# Patient Record
Sex: FEMALE | Race: WHITE | Employment: STUDENT | URBAN - METROPOLITAN AREA
[De-identification: names, ages, dates, MRNs, and addresses within clinical notes are randomized per-mention and may not be internally consistent; named-entity substitution may affect disease eponyms.]

---

## 2024-11-14 ENCOUNTER — OFFICE VISIT (OUTPATIENT)
Dept: FAMILY MEDICINE CLINIC | Facility: CLINIC | Age: 29
End: 2024-11-14
Payer: COMMERCIAL

## 2024-11-14 VITALS
WEIGHT: 145.63 LBS | BODY MASS INDEX: 23.41 KG/M2 | SYSTOLIC BLOOD PRESSURE: 150 MMHG | RESPIRATION RATE: 16 BRPM | HEART RATE: 92 BPM | HEIGHT: 66 IN | TEMPERATURE: 97 F | DIASTOLIC BLOOD PRESSURE: 80 MMHG

## 2024-11-14 DIAGNOSIS — N92.6 MENSES, IRREGULAR: ICD-10-CM

## 2024-11-14 DIAGNOSIS — R03.0 ELEVATED BLOOD PRESSURE READING WITHOUT DIAGNOSIS OF HYPERTENSION: ICD-10-CM

## 2024-11-14 DIAGNOSIS — Z00.00 ANNUAL PHYSICAL EXAM: Primary | ICD-10-CM

## 2024-11-14 DIAGNOSIS — Z12.4 PAPANICOLAOU SMEAR FOR CERVICAL CANCER SCREENING: ICD-10-CM

## 2024-11-14 DIAGNOSIS — Z00.00 LABORATORY EXAMINATION ORDERED AS PART OF A ROUTINE GENERAL MEDICAL EXAMINATION: ICD-10-CM

## 2024-11-14 DIAGNOSIS — Z12.83 SKIN EXAM, SCREENING FOR CANCER: ICD-10-CM

## 2024-11-14 DIAGNOSIS — Z13.89 SCREENING FOR GENITOURINARY CONDITION: ICD-10-CM

## 2024-11-14 PROCEDURE — 87624 HPV HI-RISK TYP POOLED RSLT: CPT | Performed by: NURSE PRACTITIONER

## 2024-11-14 PROCEDURE — 88175 CYTOPATH C/V AUTO FLUID REDO: CPT | Performed by: NURSE PRACTITIONER

## 2024-11-14 PROCEDURE — 99385 PREV VISIT NEW AGE 18-39: CPT | Performed by: NURSE PRACTITIONER

## 2024-11-14 RX ORDER — ESCITALOPRAM OXALATE 20 MG/1
20 TABLET ORAL DAILY
COMMUNITY

## 2024-11-14 NOTE — H&P
HPI:   Jyoti Amaya is a 29 year old female who presents for a complete physical exam. Symptoms: denies discharge, itching, burning or dysuria, periods irregular- sometimes breakthrough bleeding. LMP: 10/12/2024- full flow, did have some spotting 2 weeks ago. Nexplanon-placed 2020.  Abnormal pap- denies.   Sexually active- yes, 1 partner.   STD history- denies  No family history of breast or colon cancer.  Immunizations needed: Tdap- 2008- will check her records.     Wt Readings from Last 6 Encounters:   11/14/24 145 lb 9.6 oz (66 kg)   01/30/12 149 lb 2 oz (67.6 kg) (86%, Z= 1.07)*   01/28/11 149 lb 12.8 oz (67.9 kg) (88%, Z= 1.17)*   01/27/10 141 lb 3.2 oz (64 kg) (86%, Z= 1.06)*   01/30/09 135 lb 6.4 oz (61.4 kg) (87%, Z= 1.11)*   01/29/08 118 lb 3.2 oz (53.6 kg) (80%, Z= 0.86)*     * Growth percentiles are based on CDC (Girls, 2-20 Years) data.     Body mass index is 23.5 kg/m².       No results found for this or any previous visit.     Current Outpatient Medications   Medication Sig Dispense Refill    escitalopram (LEXAPRO) 20 MG Oral Tab Take 1 tablet (20 mg total) by mouth daily.        No past medical history on file.   Past Surgical History:   Procedure Laterality Date    Repair cruciate ligament,knee  7/19/2011    R ACL      Family History   Problem Relation Age of Onset    Heart Disease Paternal Grandmother     High Cholesterol Paternal Grandmother     High Blood Pressure Paternal Grandmother       Social History:   Social History     Socioeconomic History    Marital status: Single   Tobacco Use    Smoking status: Never    Smokeless tobacco: Never   Vaping Use    Vaping status: Never Used   Substance and Sexual Activity    Alcohol use: Yes     Comment: seldom    Drug use: Yes     Types: Cannabis     Comment: occ    Sexual activity: Yes     Partners: Male   Other Topics Concern    Caffeine Concern Yes     Comment: 1-2 times daily    Exercise No     Occ: Peds Psychiatric NP.  : Y.  Children: N.    Exercise: minimal.  Diet: monitors     REVIEW OF SYSTEMS:   GENERAL: denies fevers, weakness, trouble sleeping or weight changes  SKIN: denies any unusual skin lesions or rashes  EYES:denies vision changes  HEENT: denies upper respiratory symptoms  LUNGS: denies cough or shortness of breath with exertion  CHEST:  denies breast changes or pain  CARDIOVASCULAR: denies chest pain or tightness on exertion: no edema  VASCULAR: denies leg cramps  GI: denies abdominal pain, bowel movement changes, blood in stool  : denies urinary problems, vaginal discharge or discomfort,  periods irregular  MUSCULOSKELETAL: denies joint pain or stiffness  NEURO: denies headaches, tingling or dizziness  PSYCHE: denies depression or anxiety  HEMATOLOGIC: denies bleeding abnormalities  ENDOCRINE: denies temperature intolerance, polyuria, or excessive sweating.  LYMPHATICS: denies swollen glands  EXAM:   /68   Pulse 92   Temp 97 °F (36.1 °C) (Temporal)   Resp 16   Ht 5' 6\" (1.676 m)   Wt 145 lb 9.6 oz (66 kg)   LMP 10/12/2024 (Exact Date)   BMI 23.50 kg/m²   Body mass index is 23.5 kg/m².   GENERAL: well developed, well nourished and in no apparent distress  SKIN: no rashes  HEENT: atraumatic, normocephalic,ears, nose and throat are normal  EYES: PERRLA, EOMI, sclera, conjunctiva are clear  NECK: supple,no adenopathy, no thyromegaly  BREAST: symmetrical, no suspicious mass, no nipple dimpling or discharge.  LUNGS: clear to auscultation bilateral, no rales, rhonchi or wheezing  CARDIO: RRR without murmur normal S1S2  ABD:  normal bowel sounds,soft, non tender, no masses, HSM or tenderness  :external labia, introitus and vagina are normal, normal discharge and normal cervix.  Bimanual exam normal no adnexal masses or cervical motion tenderness, PAP was done  MUSCULOSKELETAL: gait peter,l no gross M/S defect.  EXTREMITIES: no clubbing, cyanosis, or edema  NEURO: oriented times three, cranial nerves are grossly intact, no gross  motor or sensory deficit.  ASSESSMENT AND PLAN:   Jyoti Amaya is a 29 year old female who presents for a complete physical exam.    Pap and pelvic- completed.    Self breast exam explained. Health maintenance guidance given including vision and dental exams discussed. Lifestyle guidance provided recommended low fat diet and aerobic exercise 30 minutes 3-4 times weekly. Condoms if applicable.  The patient indicates understanding of these issues and agrees to the plan.  The patient is asked to return for complete physical yearly.    There are no Patient Instructions on file for this visit.  No follow-ups on file.    1. Annual physical exam    2. BMI 23.0-23.9, adult    3. Laboratory examination ordered as part of a routine general medical examination  - CBC With Differential With Platelet  - Comp Metabolic Panel (14)  - Lipid Panel  - TSH W Reflex To Free T4    4. Screening for genitourinary condition  - Urinalysis with Culture Reflex    5. Papanicolaou smear for cervical cancer screening  - ThinPrep PAP with HPV Reflex Request B; Future  - ThinPrep PAP with HPV Reflex Request B  - Image-Guided Pap Smear ([facility])    6. Menses, irregular    7. Skin exam, screening for cancer  - Derm Referral - In Network    8. Elevated blood pressure reading without diagnosis of hypertension      Derm referral placed.  Fasting labs as ordered.  Pt will check immunization record and get record to our office to update medical record.  Pap/pelvic completed today.  Nexplanon contraception. Condoms if sexually active.  Elevated BP- pt reports hx of white coat syndrome, pt to check BP BID  x 3 days and update me with home BPs- pt voiced understanding.  Continue to focus on healthy eating, routine exercise, Body mass index is 23.5 kg/m².

## 2024-11-22 LAB
.: NORMAL
.: NORMAL

## 2024-11-25 LAB — HPV E6+E7 MRNA CVX QL NAA+PROBE: NEGATIVE

## 2025-01-10 NOTE — TELEPHONE ENCOUNTER
Last Office Visit: 11/14/2024    Last Refill:   Medication Quantity Refills Start End   escitalopram (LEXAPRO) 20 MG Oral Tab -- --  --     Return to Clinic: fredo pal[facility] 01/21/2025    Protocol: n/a    Refill pended. Please approve if okay. Thank you.  Please also see BP log

## 2025-01-22 RX ORDER — ESCITALOPRAM OXALATE 20 MG/1
20 TABLET ORAL DAILY
Qty: 90 TABLET | Refills: 0 | OUTPATIENT
Start: 2025-01-22

## 2025-02-11 ENCOUNTER — OFFICE VISIT (OUTPATIENT)
Dept: FAMILY MEDICINE CLINIC | Facility: CLINIC | Age: 30
End: 2025-02-11
Payer: COMMERCIAL

## 2025-02-11 VITALS
DIASTOLIC BLOOD PRESSURE: 56 MMHG | BODY MASS INDEX: 24.59 KG/M2 | HEIGHT: 66 IN | WEIGHT: 153 LBS | HEART RATE: 98 BPM | TEMPERATURE: 98 F | RESPIRATION RATE: 16 BRPM | SYSTOLIC BLOOD PRESSURE: 130 MMHG

## 2025-02-11 DIAGNOSIS — F32.89 OTHER DEPRESSION: ICD-10-CM

## 2025-02-11 DIAGNOSIS — F90.9 ATTENTION DEFICIT HYPERACTIVITY DISORDER (ADHD), UNSPECIFIED ADHD TYPE: ICD-10-CM

## 2025-02-11 PROCEDURE — 99214 OFFICE O/P EST MOD 30 MIN: CPT | Performed by: FAMILY MEDICINE

## 2025-02-11 RX ORDER — DEXTROAMPHETAMINE SACCHARATE, AMPHETAMINE ASPARTATE, DEXTROAMPHETAMINE SULFATE AND AMPHETAMINE SULFATE 1.25; 1.25; 1.25; 1.25 MG/1; MG/1; MG/1; MG/1
0.5 TABLET ORAL DAILY
Qty: 15 TABLET | Refills: 0 | Status: SHIPPED | OUTPATIENT
Start: 2025-04-14 | End: 2025-05-14

## 2025-02-11 RX ORDER — ESCITALOPRAM OXALATE 20 MG/1
20 TABLET ORAL DAILY
Qty: 90 TABLET | Refills: 0 | Status: SHIPPED | OUTPATIENT
Start: 2025-02-11

## 2025-02-11 RX ORDER — DEXTROAMPHETAMINE SACCHARATE, AMPHETAMINE ASPARTATE MONOHYDRATE, DEXTROAMPHETAMINE SULFATE AND AMPHETAMINE SULFATE 1.25; 1.25; 1.25; 1.25 MG/1; MG/1; MG/1; MG/1
5 CAPSULE, EXTENDED RELEASE ORAL DAILY
Qty: 30 CAPSULE | Refills: 0 | Status: SHIPPED | OUTPATIENT
Start: 2025-03-14 | End: 2025-04-13

## 2025-02-11 RX ORDER — DEXTROAMPHETAMINE SACCHARATE, AMPHETAMINE ASPARTATE, DEXTROAMPHETAMINE SULFATE AND AMPHETAMINE SULFATE 1.25; 1.25; 1.25; 1.25 MG/1; MG/1; MG/1; MG/1
0.5 TABLET ORAL DAILY
Qty: 15 TABLET | Refills: 0 | Status: SHIPPED | OUTPATIENT
Start: 2025-03-14 | End: 2025-04-13

## 2025-02-11 RX ORDER — DEXTROAMPHETAMINE SACCHARATE, AMPHETAMINE ASPARTATE MONOHYDRATE, DEXTROAMPHETAMINE SULFATE AND AMPHETAMINE SULFATE 1.25; 1.25; 1.25; 1.25 MG/1; MG/1; MG/1; MG/1
5 CAPSULE, EXTENDED RELEASE ORAL DAILY
Qty: 30 CAPSULE | Refills: 0 | Status: SHIPPED | OUTPATIENT
Start: 2025-02-11 | End: 2025-03-13

## 2025-02-11 RX ORDER — DEXTROAMPHETAMINE SACCHARATE, AMPHETAMINE ASPARTATE, DEXTROAMPHETAMINE SULFATE AND AMPHETAMINE SULFATE 1.25; 1.25; 1.25; 1.25 MG/1; MG/1; MG/1; MG/1
0.5 TABLET ORAL DAILY
Qty: 15 TABLET | Refills: 0 | Status: SHIPPED | OUTPATIENT
Start: 2025-02-11 | End: 2025-03-13

## 2025-02-11 RX ORDER — DEXTROAMPHETAMINE SACCHARATE, AMPHETAMINE ASPARTATE MONOHYDRATE, DEXTROAMPHETAMINE SULFATE AND AMPHETAMINE SULFATE 1.25; 1.25; 1.25; 1.25 MG/1; MG/1; MG/1; MG/1
5 CAPSULE, EXTENDED RELEASE ORAL DAILY
Qty: 30 CAPSULE | Refills: 0 | Status: SHIPPED | OUTPATIENT
Start: 2025-04-14 | End: 2025-05-14

## 2025-02-11 NOTE — PROGRESS NOTES
[facility] Family Medicine Office Note  Chief Complaint:   Chief Complaint   Patient presents with    Medication Follow-Up       HPI:   This is a 29 year old female coming in for medication follow-up.  The patient has been using Adderall to help with increased trouble focusing.  She states that overall she has had a significant improvement in regards to finishing tasks.  She does state though at around 2 PM she starts to have some trouble focusing.  She states that her  has noticed a difference with the medication as well she has not had any heart palpitations chest pain constipation with the medication.      No past medical history on file.  Past Surgical History:   Procedure Laterality Date    Repair cruciate ligament,knee  7/19/2011    R ACL     Social History:  Social History     Socioeconomic History    Marital status: Single   Tobacco Use    Smoking status: Never    Smokeless tobacco: Never   Vaping Use    Vaping status: Never Used   Substance and Sexual Activity    Alcohol use: Yes     Comment: seldom    Drug use: Yes     Types: Cannabis     Comment: occ    Sexual activity: Yes     Partners: Male   Other Topics Concern    Caffeine Concern Yes     Comment: 1-2 times daily    Exercise No     Family History:  Family History   Problem Relation Age of Onset    Heart Disease Paternal Grandmother     High Cholesterol Paternal Grandmother     High Blood Pressure Paternal Grandmother      Allergies:  Allergies[1]  Current Meds:  Current Outpatient Medications   Medication Sig Dispense Refill    amphetamine-dextroamphetamine ER (ADDERALL XR) 5 MG Oral Capsule SR 24 Hr Take 1 capsule (5 mg total) by mouth daily. 30 capsule 0    [START ON 3/14/2025] amphetamine-dextroamphetamine ER (ADDERALL XR) 5 MG Oral Capsule SR 24 Hr Take 1 capsule (5 mg total) by mouth daily. 30 capsule 0    [START ON 4/14/2025] amphetamine-dextroamphetamine ER (ADDERALL XR) 5 MG Oral Capsule SR 24 Hr Take 1 capsule (5 mg total) by  mouth daily. 30 capsule 0    escitalopram (LEXAPRO) 20 MG Oral Tab Take 1 tablet (20 mg total) by mouth daily. 90 tablet 0    amphetamine-dextroamphetamine 5 MG Oral Tab Take 0.5 tablets by mouth daily. 15 tablet 0    [START ON 3/14/2025] amphetamine-dextroamphetamine 5 MG Oral Tab Take 0.5 tablets by mouth daily. 15 tablet 0    [START ON 4/14/2025] amphetamine-dextroamphetamine 5 MG Oral Tab Take 0.5 tablets by mouth daily. 15 tablet 0    Etonogestrel (NEXPLANON) 68 MG Subcutaneous Implant       amphetamine-dextroamphetamine ER (ADDERALL XR) 5 MG Oral Capsule SR 24 Hr Take 1 capsule (5 mg total) by mouth every morning. 30 capsule 0      Counseling given: Not Answered       REVIEW OF SYSTEMS:   Consitutional: No fevers, chills, sweats  Eye: No recent visual problems  ENMT: No ear pain nasal congestion sore throat  Respiratory: No shortness of breath, cough  Cardiovascular: No chest pain palpitations syncope  Gastrointestinal: No nausea vomiting diarrhea  Genitourinary: No hematuria  Hema/Lymph no bruising tendency, swollen lymph glands  Psychiatric: No anxiety, depression    Medical, surgical, family, and social histories were reviewed      EXAM:   VITALS:   Vitals:    02/11/25 0710   BP: 130/56   Pulse: 98   Resp: 16   Temp: 98.3 °F (36.8 °C)      GENERAL: well developed, well nourished, in no apparent distress  SKIN: no rashes, no suspicious lesions: Cool and Dry  HEENT: atraumatic, normocephalic, ears and throat are clear    Ears: TM's clear and visible bilaterally, no excess cerumen or erythema.   EYES: Pupils equal round and reactive.  Extraocular motions intact no scleral icterus no injection or drainage  THROAT without erythema tonsillar hypertrophy or exudate.  Uvula midline airway patent  NECK: Given midline.  No JVD or lymphadenopathy supple nontender no meningeal signs   LUNGS: clear to auscultation sounds equal bilaterally no wheezes rales or rhonchi  CARDIO: Regular rate and rhythm without murmurs  gallops or rubs  PSYCHIATRIC: Awake, alert and oriented x3, cooperative appropriate mood and affect.  Judgment intact       ASSESSMENT AND PLAN:   1. Attention deficit hyperactivity disorder (ADHD), unspecified ADHD type  - amphetamine-dextroamphetamine ER (ADDERALL XR) 5 MG Oral Capsule SR 24 Hr; Take 1 capsule (5 mg total) by mouth daily.  Dispense: 30 capsule; Refill: 0  - amphetamine-dextroamphetamine ER (ADDERALL XR) 5 MG Oral Capsule SR 24 Hr; Take 1 capsule (5 mg total) by mouth daily.  Dispense: 30 capsule; Refill: 0  - amphetamine-dextroamphetamine ER (ADDERALL XR) 5 MG Oral Capsule SR 24 Hr; Take 1 capsule (5 mg total) by mouth daily.  Dispense: 30 capsule; Refill: 0  - escitalopram (LEXAPRO) 20 MG Oral Tab; Take 1 tablet (20 mg total) by mouth daily.  Dispense: 90 tablet; Refill: 0  - amphetamine-dextroamphetamine 5 MG Oral Tab; Take 0.5 tablets by mouth daily.  Dispense: 15 tablet; Refill: 0  - amphetamine-dextroamphetamine 5 MG Oral Tab; Take 0.5 tablets by mouth daily.  Dispense: 15 tablet; Refill: 0  - amphetamine-dextroamphetamine 5 MG Oral Tab; Take 0.5 tablets by mouth daily.  Dispense: 15 tablet; Refill: 0  I did discuss with the patient at this time that I would like to continue with the Adderall 5 mg extended release in the morning.  We did discuss that we can try half the dose in the afternoon of 2.5 mg.  She was asked to follow-up in the next 3 months she denies any chest pain headaches constipation.  2. Other depression  - escitalopram (LEXAPRO) 20 MG Oral Tab; Take 1 tablet (20 mg total) by mouth daily.  Dispense: 90 tablet; Refill: 0       Meds & Refills for this Visit:  Requested Prescriptions     Signed Prescriptions Disp Refills    amphetamine-dextroamphetamine ER (ADDERALL XR) 5 MG Oral Capsule SR 24 Hr 30 capsule 0     Sig: Take 1 capsule (5 mg total) by mouth daily.    amphetamine-dextroamphetamine ER (ADDERALL XR) 5 MG Oral Capsule SR 24 Hr 30 capsule 0     Sig: Take 1 capsule (5 mg  total) by mouth daily.    amphetamine-dextroamphetamine ER (ADDERALL XR) 5 MG Oral Capsule SR 24 Hr 30 capsule 0     Sig: Take 1 capsule (5 mg total) by mouth daily.    escitalopram (LEXAPRO) 20 MG Oral Tab 90 tablet 0     Sig: Take 1 tablet (20 mg total) by mouth daily.    amphetamine-dextroamphetamine 5 MG Oral Tab 15 tablet 0     Sig: Take 0.5 tablets by mouth daily.    amphetamine-dextroamphetamine 5 MG Oral Tab 15 tablet 0     Sig: Take 0.5 tablets by mouth daily.    amphetamine-dextroamphetamine 5 MG Oral Tab 15 tablet 0     Sig: Take 0.5 tablets by mouth daily.       Health Maintenance:  Health Maintenance Due   Topic Date Due    DTaP,Tdap,and Td Vaccines (7 - Td or Tdap) 01/29/2018    Annual Depression Screening  01/01/2025       Patient/Caregiver Education: Patient/Caregiver Education: There are no barriers to learning. Medical education done.   Outcome: Patient verbalizes understanding. Patient is notified to call with any questions, complications, allergies, or worsening or changing symptoms.  Patient is to call with any side effects or complications from the treatments as a result of today.     Problem List:  There is no problem list on file for this patient.        No follow-ups on file.     Sachin Ye MD    Please note that portions of this note may have been completed with a voice recognition program. Efforts were made to edit the dictations but occasionally words are mis-transcribed.       [1] No Known Allergies

## 2025-04-26 ENCOUNTER — PATIENT MESSAGE (OUTPATIENT)
Dept: FAMILY MEDICINE CLINIC | Facility: CLINIC | Age: 30
End: 2025-04-26

## 2025-04-26 DIAGNOSIS — Z30.40 ENCOUNTER FOR SURVEILLANCE OF CONTRACEPTIVES, UNSPECIFIED CONTRACEPTIVE: Primary | ICD-10-CM

## 2025-05-22 ENCOUNTER — TELEPHONE (OUTPATIENT)
Facility: CLINIC | Age: 30
End: 2025-05-22

## 2025-05-22 NOTE — TELEPHONE ENCOUNTER
Lvm that we were able to schedule patient's 5/28/25 appt for 11:30 am, asked patient to confirm that new appt time

## 2025-05-22 NOTE — TELEPHONE ENCOUNTER
Lvm to reschedule appt as a Procedure to remove Nexplanon implant, okgianluca per Dr. Paz. Provider unavailable on 5/28/25

## 2025-05-27 ENCOUNTER — TELEPHONE (OUTPATIENT)
Facility: CLINIC | Age: 30
End: 2025-05-27

## 2025-05-28 ENCOUNTER — PATIENT MESSAGE (OUTPATIENT)
Dept: FAMILY MEDICINE CLINIC | Facility: CLINIC | Age: 30
End: 2025-05-28

## 2025-05-28 ENCOUNTER — OFFICE VISIT (OUTPATIENT)
Dept: FAMILY MEDICINE CLINIC | Facility: CLINIC | Age: 30
End: 2025-05-28
Payer: COMMERCIAL

## 2025-05-28 VITALS
RESPIRATION RATE: 16 BRPM | HEART RATE: 95 BPM | SYSTOLIC BLOOD PRESSURE: 120 MMHG | WEIGHT: 162 LBS | BODY MASS INDEX: 26.03 KG/M2 | DIASTOLIC BLOOD PRESSURE: 56 MMHG | TEMPERATURE: 98 F | HEIGHT: 66 IN

## 2025-05-28 DIAGNOSIS — F32.89 OTHER DEPRESSION: ICD-10-CM

## 2025-05-28 DIAGNOSIS — Z00.00 HEALTHCARE MAINTENANCE: ICD-10-CM

## 2025-05-28 DIAGNOSIS — F90.9 ATTENTION DEFICIT HYPERACTIVITY DISORDER (ADHD), UNSPECIFIED ADHD TYPE: ICD-10-CM

## 2025-05-28 PROCEDURE — 99214 OFFICE O/P EST MOD 30 MIN: CPT | Performed by: FAMILY MEDICINE

## 2025-05-28 RX ORDER — DEXTROAMPHETAMINE SACCHARATE, AMPHETAMINE ASPARTATE MONOHYDRATE, DEXTROAMPHETAMINE SULFATE AND AMPHETAMINE SULFATE 1.25; 1.25; 1.25; 1.25 MG/1; MG/1; MG/1; MG/1
5 CAPSULE, EXTENDED RELEASE ORAL DAILY
Qty: 30 CAPSULE | Refills: 0 | Status: CANCELLED | OUTPATIENT
Start: 2025-06-28 | End: 2025-07-28

## 2025-05-28 RX ORDER — DEXTROAMPHETAMINE SACCHARATE, AMPHETAMINE ASPARTATE MONOHYDRATE, DEXTROAMPHETAMINE SULFATE AND AMPHETAMINE SULFATE 1.25; 1.25; 1.25; 1.25 MG/1; MG/1; MG/1; MG/1
5 CAPSULE, EXTENDED RELEASE ORAL DAILY
Qty: 30 CAPSULE | Refills: 0 | Status: CANCELLED | OUTPATIENT
Start: 2025-07-29 | End: 2025-08-28

## 2025-05-28 RX ORDER — DEXTROAMPHETAMINE SACCHARATE, AMPHETAMINE ASPARTATE MONOHYDRATE, DEXTROAMPHETAMINE SULFATE AND AMPHETAMINE SULFATE 2.5; 2.5; 2.5; 2.5 MG/1; MG/1; MG/1; MG/1
10 CAPSULE, EXTENDED RELEASE ORAL DAILY
Qty: 30 CAPSULE | Refills: 0 | Status: SHIPPED | OUTPATIENT
Start: 2025-07-29 | End: 2025-08-28

## 2025-05-28 RX ORDER — ESCITALOPRAM OXALATE 20 MG/1
20 TABLET ORAL DAILY
Qty: 90 TABLET | Refills: 0 | Status: CANCELLED | OUTPATIENT
Start: 2025-05-28

## 2025-05-28 RX ORDER — DEXTROAMPHETAMINE SACCHARATE, AMPHETAMINE ASPARTATE MONOHYDRATE, DEXTROAMPHETAMINE SULFATE AND AMPHETAMINE SULFATE 2.5; 2.5; 2.5; 2.5 MG/1; MG/1; MG/1; MG/1
10 CAPSULE, EXTENDED RELEASE ORAL DAILY
Qty: 30 CAPSULE | Refills: 0 | Status: SHIPPED | OUTPATIENT
Start: 2025-05-28 | End: 2025-06-27

## 2025-05-28 RX ORDER — DEXTROAMPHETAMINE SACCHARATE, AMPHETAMINE ASPARTATE, DEXTROAMPHETAMINE SULFATE AND AMPHETAMINE SULFATE 1.25; 1.25; 1.25; 1.25 MG/1; MG/1; MG/1; MG/1
5 TABLET ORAL DAILY
Qty: 30 TABLET | Refills: 0 | Status: SHIPPED | OUTPATIENT
Start: 2025-06-28 | End: 2025-07-28

## 2025-05-28 RX ORDER — DEXTROAMPHETAMINE SACCHARATE, AMPHETAMINE ASPARTATE, DEXTROAMPHETAMINE SULFATE AND AMPHETAMINE SULFATE 1.25; 1.25; 1.25; 1.25 MG/1; MG/1; MG/1; MG/1
5 TABLET ORAL DAILY
COMMUNITY
Start: 2025-02-17

## 2025-05-28 RX ORDER — DEXTROAMPHETAMINE SACCHARATE, AMPHETAMINE ASPARTATE MONOHYDRATE, DEXTROAMPHETAMINE SULFATE AND AMPHETAMINE SULFATE 2.5; 2.5; 2.5; 2.5 MG/1; MG/1; MG/1; MG/1
10 CAPSULE, EXTENDED RELEASE ORAL DAILY
Qty: 30 CAPSULE | Refills: 0 | Status: SHIPPED | OUTPATIENT
Start: 2025-06-28 | End: 2025-07-28

## 2025-05-28 RX ORDER — DEXTROAMPHETAMINE SACCHARATE, AMPHETAMINE ASPARTATE MONOHYDRATE, DEXTROAMPHETAMINE SULFATE AND AMPHETAMINE SULFATE 1.25; 1.25; 1.25; 1.25 MG/1; MG/1; MG/1; MG/1
5 CAPSULE, EXTENDED RELEASE ORAL EVERY MORNING
COMMUNITY
Start: 2025-01-20 | End: 2025-05-28

## 2025-05-28 RX ORDER — DEXTROAMPHETAMINE SACCHARATE, AMPHETAMINE ASPARTATE, DEXTROAMPHETAMINE SULFATE AND AMPHETAMINE SULFATE 1.25; 1.25; 1.25; 1.25 MG/1; MG/1; MG/1; MG/1
5 TABLET ORAL DAILY
Qty: 30 TABLET | Refills: 0 | Status: SHIPPED | OUTPATIENT
Start: 2025-05-28 | End: 2025-06-26

## 2025-05-28 RX ORDER — DEXTROAMPHETAMINE SACCHARATE, AMPHETAMINE ASPARTATE, DEXTROAMPHETAMINE SULFATE AND AMPHETAMINE SULFATE 1.25; 1.25; 1.25; 1.25 MG/1; MG/1; MG/1; MG/1
5 TABLET ORAL DAILY
Qty: 30 TABLET | Refills: 0 | Status: SHIPPED | OUTPATIENT
Start: 2025-07-29 | End: 2025-08-28

## 2025-05-28 RX ORDER — DEXTROAMPHETAMINE SACCHARATE, AMPHETAMINE ASPARTATE MONOHYDRATE, DEXTROAMPHETAMINE SULFATE AND AMPHETAMINE SULFATE 1.25; 1.25; 1.25; 1.25 MG/1; MG/1; MG/1; MG/1
5 CAPSULE, EXTENDED RELEASE ORAL DAILY
Qty: 30 CAPSULE | Refills: 0 | Status: CANCELLED | OUTPATIENT
Start: 2025-05-28 | End: 2025-06-27

## 2025-05-28 NOTE — PROGRESS NOTES
[facility] Family Medicine Office Note  Chief Complaint:   Chief Complaint   Patient presents with    Medication Follow-Up    ADHD    Anxiety       HPI:   This is a 30 year old female coming in for medication follow-up.  The patient states that she has been benefiting from the Adderall but states at times she feels that the effects wearing off.  She has not had any headaches chest pain heart palpitations changes in vision.  She does have a history of depression and has been taking her medications as prescribed      Past Medical History[1]  Past Surgical History[2]  Social History:  Short Social Hx on File[3]  Family History:  Family History[4]  Allergies:  Allergies[5]  Current Meds:  Current Medications[6]   Counseling given: Not Answered       REVIEW OF SYSTEMS:   Consitutional: No fevers, chills, sweats  Eye: No recent visual problems  ENMT: No ear pain nasal congestion sore throat  Respiratory: No shortness of breath, cough  Cardiovascular: No chest pain palpitations syncope  Gastrointestinal: No nausea vomiting diarrhea  Genitourinary: No hematuria  Psychiatric: No anxiety, depression    Medical, surgical, family, and social histories were reviewed      EXAM:     VITALS:   Vitals:    05/28/25 0724   BP: 120/56   Pulse: 95   Resp: 16   Temp: 98.3 °F (36.8 °C)      GENERAL: well developed, well nourished, in no apparent distress  SKIN: no rashes, no suspicious lesions: Cool and Dry  HEENT: atraumatic, normocephalic, ears and throat are clear    Ears: TM's clear and visible bilaterally, no excess cerumen or erythema.   EYES: Pupils equal round and reactive.  Extraocular motions intact no scleral icterus no injection or drainage  THROAT without erythema tonsillar hypertrophy or exudate.  Uvula midline airway patent  NECK: Given midline.  No JVD or lymphadenopathy supple nontender no meningeal signs   LUNGS: clear to auscultation sounds equal bilaterally no wheezes rales or rhonchi  CARDIO: Regular rate  and rhythm without murmurs gallops or rubs  PSYCHIATRIC: Awake, alert and oriented x3, cooperative appropriate mood and affect.  Judgment intact       ASSESSMENT AND PLAN:   1. Attention deficit hyperactivity disorder (ADHD), unspecified ADHD type  - amphetamine-dextroamphetamine 5 MG Oral Tab; Take 1 tablet (5 mg total) by mouth daily.  Dispense: 30 tablet; Refill: 0  - amphetamine-dextroamphetamine 5 MG Oral Tab; Take 1 tablet (5 mg total) by mouth daily.  Dispense: 30 tablet; Refill: 0  - amphetamine-dextroamphetamine 5 MG Oral Tab; Take 1 tablet (5 mg total) by mouth daily.  Dispense: 30 tablet; Refill: 0  - amphetamine-dextroamphetamine ER (ADDERALL XR) 10 MG Oral Capsule SR 24 Hr; Take 1 capsule (10 mg total) by mouth daily.  Dispense: 30 capsule; Refill: 0  - amphetamine-dextroamphetamine ER (ADDERALL XR) 10 MG Oral Capsule SR 24 Hr; Take 1 capsule (10 mg total) by mouth daily.  Dispense: 30 capsule; Refill: 0  - amphetamine-dextroamphetamine ER (ADDERALL XR) 10 MG Oral Capsule SR 24 Hr; Take 1 capsule (10 mg total) by mouth daily.  Dispense: 30 capsule; Refill: 0  I did discuss with the patient at this time we can increase the Adderall in the morning to 10 mg extended release as well as to continue with the 5 mg in the afternoon.  She was asked to follow-up in the next 3 months to ensure that this continues to benefit her.  2. Other depression    3. Healthcare maintenance  - CBC With Differential With Platelet  - Comp Metabolic Panel (14)  - Lipid Panel  - Triiodothyronine (T3) Total  - TSH and Free T4  I did discuss with the patient to update blood work she needs CBC CMP free T4 free T3 TSH as well as lipid panel.    Meds & Refills for this Visit:  Requested Prescriptions     Signed Prescriptions Disp Refills    amphetamine-dextroamphetamine 5 MG Oral Tab 30 tablet 0     Sig: Take 1 tablet (5 mg total) by mouth daily.    amphetamine-dextroamphetamine 5 MG Oral Tab 30 tablet 0     Sig: Take 1 tablet (5 mg  total) by mouth daily.    amphetamine-dextroamphetamine 5 MG Oral Tab 30 tablet 0     Sig: Take 1 tablet (5 mg total) by mouth daily.    amphetamine-dextroamphetamine ER (ADDERALL XR) 10 MG Oral Capsule SR 24 Hr 30 capsule 0     Sig: Take 1 capsule (10 mg total) by mouth daily.    amphetamine-dextroamphetamine ER (ADDERALL XR) 10 MG Oral Capsule SR 24 Hr 30 capsule 0     Sig: Take 1 capsule (10 mg total) by mouth daily.    amphetamine-dextroamphetamine ER (ADDERALL XR) 10 MG Oral Capsule SR 24 Hr 30 capsule 0     Sig: Take 1 capsule (10 mg total) by mouth daily.       Health Maintenance:  Health Maintenance Due   Topic Date Due    Annual Depression Screening  01/01/2025       Patient/Caregiver Education: Patient/Caregiver Education: There are no barriers to learning. Medical education done.   Outcome: Patient verbalizes understanding. Patient is notified to call with any questions, complications, allergies, or worsening or changing symptoms.  Patient is to call with any side effects or complications from the treatments as a result of today.     Problem List:  Problem List[7]      No follow-ups on file.     Sachin Ye MD    Please note that portions of this note may have been completed with a voice recognition program. Efforts were made to edit the dictations but occasionally words are mis-transcribed.       [1] No past medical history on file.  [2]   Past Surgical History:  Procedure Laterality Date    Repair cruciate ligament,knee  7/19/2011    R ACL   [3]   Social History  Socioeconomic History    Marital status:    Tobacco Use    Smoking status: Never    Smokeless tobacco: Never   Vaping Use    Vaping status: Never Used   Substance and Sexual Activity    Alcohol use: Yes     Comment: seldom    Drug use: Yes     Types: Cannabis     Comment: occ    Sexual activity: Yes     Partners: Male   Other Topics Concern    Caffeine Concern Yes     Comment: 1-2 times daily    Exercise No   [4]   Family  History  Problem Relation Age of Onset    Heart Disease Paternal Grandmother     High Cholesterol Paternal Grandmother     High Blood Pressure Paternal Grandmother    [5] No Known Allergies  [6]   Current Outpatient Medications   Medication Sig Dispense Refill    amphetamine-dextroamphetamine (ADDERALL) 5 MG Oral Tab Take 1 tablet (5 mg total) by mouth daily.      amphetamine-dextroamphetamine 5 MG Oral Tab Take 1 tablet (5 mg total) by mouth daily. 30 tablet 0    [START ON 6/28/2025] amphetamine-dextroamphetamine 5 MG Oral Tab Take 1 tablet (5 mg total) by mouth daily. 30 tablet 0    [START ON 7/29/2025] amphetamine-dextroamphetamine 5 MG Oral Tab Take 1 tablet (5 mg total) by mouth daily. 30 tablet 0    amphetamine-dextroamphetamine ER (ADDERALL XR) 10 MG Oral Capsule SR 24 Hr Take 1 capsule (10 mg total) by mouth daily. 30 capsule 0    [START ON 6/28/2025] amphetamine-dextroamphetamine ER (ADDERALL XR) 10 MG Oral Capsule SR 24 Hr Take 1 capsule (10 mg total) by mouth daily. 30 capsule 0    [START ON 7/29/2025] amphetamine-dextroamphetamine ER (ADDERALL XR) 10 MG Oral Capsule SR 24 Hr Take 1 capsule (10 mg total) by mouth daily. 30 capsule 0    escitalopram (LEXAPRO) 20 MG Oral Tab Take 1 tablet (20 mg total) by mouth daily. 90 tablet 0    Etonogestrel (NEXPLANON) 68 MG Subcutaneous Implant      [7] There is no problem list on file for this patient.

## 2025-06-04 ENCOUNTER — TELEPHONE (OUTPATIENT)
Facility: CLINIC | Age: 30
End: 2025-06-04

## 2025-06-16 ENCOUNTER — TELEPHONE (OUTPATIENT)
Facility: CLINIC | Age: 30
End: 2025-06-16

## 2025-07-13 DIAGNOSIS — F32.89 OTHER DEPRESSION: ICD-10-CM

## 2025-07-13 DIAGNOSIS — F90.9 ATTENTION DEFICIT HYPERACTIVITY DISORDER (ADHD), UNSPECIFIED ADHD TYPE: ICD-10-CM

## 2025-07-15 RX ORDER — ESCITALOPRAM OXALATE 20 MG/1
20 TABLET ORAL DAILY
Qty: 90 TABLET | Refills: 0 | Status: SHIPPED | OUTPATIENT
Start: 2025-07-15

## 2025-07-15 NOTE — TELEPHONE ENCOUNTER
Last Office Visit: 05/28/2025    Last Refill:   Medication Quantity Refills Start End   escitalopram (LEXAPRO) 20 MG Oral Tab 90 tablet 0 2/11/2025 --     Return to Clinic: 08/28/2025    Protocol: n/a    Refill pended. Please approve if okay. Thank you.

## 2025-08-07 ENCOUNTER — TELEPHONE (OUTPATIENT)
Facility: CLINIC | Age: 30
End: 2025-08-07